# Patient Record
Sex: FEMALE | Race: WHITE
[De-identification: names, ages, dates, MRNs, and addresses within clinical notes are randomized per-mention and may not be internally consistent; named-entity substitution may affect disease eponyms.]

---

## 2018-11-26 ENCOUNTER — HOSPITAL ENCOUNTER (EMERGENCY)
Dept: HOSPITAL 61 - ER | Age: 23
Discharge: HOME | End: 2018-11-26
Payer: SELF-PAY

## 2018-11-26 VITALS — WEIGHT: 120 LBS | HEIGHT: 63 IN | BODY MASS INDEX: 21.26 KG/M2

## 2018-11-26 VITALS — SYSTOLIC BLOOD PRESSURE: 112 MMHG | DIASTOLIC BLOOD PRESSURE: 67 MMHG

## 2018-11-26 DIAGNOSIS — L03.012: Primary | ICD-10-CM

## 2018-11-26 PROCEDURE — 10060 I&D ABSCESS SIMPLE/SINGLE: CPT

## 2018-11-26 PROCEDURE — 99283 EMERGENCY DEPT VISIT LOW MDM: CPT

## 2018-11-26 NOTE — PHYS DOC
Adult General


Chief Complaint


Chief Complaint:  THUMB





HPI


HPI





Patient is a 22  year old female who presents with pain and swelling over the 

left thumb and around the thumbnail. The patient has had symptoms over the last 

3-4 days. Symptoms became more severe over the last 24 hours. No fever or 

chills. She denies similar symptoms in the past. The patient does state she 

chews on her fingernails. She is uncertain when her last tetanus shot was. She 

denies additional complaints today. No trauma to that digit





Review of Systems


Review of Systems





Constitutional: Denies fever 


HENT: Denies 


Respiratory: Denies 


Cardiovascular: No additional information 


: Denies


Musculoskeletal: Denies 


Integument: Denies rash or skin lesions 





All other systems were reviewed and found to be within normal limits, except as 

documented in this note.





Current Medications


Current Medications





Current Medications








 Medications


  (Trade)  Dose


 Ordered  Sig/Katlyn  Start Time


 Stop Time Status Last Admin


Dose Admin


 


 Acetaminophen/


 Hydrocodone Bitart


  (Lortab 5/325)  2 tab  1X  ONCE  11/26/18 05:30


 11/26/18 05:31 DC 11/26/18 05:13


2 TAB


 


 Diphtheria/


 Tetanus/Acell


 Pertussis


  (Boostrix)  0.5 ml  ONCE ONCE  11/26/18 05:30


 11/26/18 05:31 DC  





 


 Lidocaine HCl


  (Lidocaine 1%


 20ml Vial)  20 ml  1X  ONCE  11/26/18 05:00


 11/26/18 05:01 DC 11/26/18 04:35


20 ML











Allergies


Allergies





Allergies








Coded Allergies Type Severity Reaction Last Updated Verified


 


  No Known Drug Allergies    11/26/18 No











Physical Exam


Physical Exam





Constitutional: Well developed, well nourished, no acute distress, non-toxic 

appearance


HENT: Normocephalic, atraumatic, bilateral external ears normal, oropharynx 

moist


Eyes: PERRLA 


Neck: Normal range of motion  


Skin: Warm, dry, no erythema  


Extremities: erythema and swelling around the left thumbnail 


Neurologic: Alert and oriented X 3


Psychologic: Affect normal





Current Patient Data


Vital Signs





 Vital Signs








  Date Time  Temp Pulse Resp B/P (MAP) Pulse Ox O2 Delivery O2 Flow Rate FiO2


 


11/26/18 05:13   16  100 Room Air  


 


11/26/18 03:57  98  112/67 (82)    











EKG


EKG


[]





Radiology/Procedures


Radiology/Procedures


[]





Course & Med Decision Making


Course & Med Decision Making


Pertinent Labs and Imaging studies reviewed. (See chart for details)





Patient was evaluated in the emergency department for a paronychia. The fluid 

was drained. Referred to the procedure note below. In the ER, she was offered a 

tetanus booster but she refused this injection. She was given 2 Norco for pain. 

The patient was not driving herself home. She is also started on Keflex. At 

discharge, appropriate use of opiate medications was discussed. All of her 

questions were answered prior to discharge home.





Procedure note:  Paronychia left thumb nail. Local anesthesia was provided with 

5 total milliliters of 1% lidocaine. This was after the area was cleansed with 

Betadine. Once adequate anesthesia was achieved, curved scissors were used to 

release the eponychia from the nail. Local tissue was probed and there were 

several pockets of purulent fluid which were relieved. Patient tolerated the 

procedure very well.  Dressing and splint was placed for comfort.





Dragon Disclaimer


Dragon Disclaimer


This electronic medical record was generated, in whole or in part, using a 

voice recognition dictation system.





Departure


Departure


Disposition:  01 HOME, SELF-CARE


Condition:  GOOD


Referrals:  


NO PCP (PCP)


Scripts


Cephalexin (KEFLEX) 500 Mg Capsule


500 MG PO QID for 10 Days, #40 CAP


   Prov: ANNETTE SILVER DO         11/26/18 


Hydrocodone/Apap 5-325 (NORCO 5-325 TABLET) 1 Each Tablet


1-2 EACH PO PRN Q6HRS PRN for SEVERE PAIN, #15


   as needed for pain


   Prov: ANNETTE SILVER DO         11/26/18











ANNETTE SILVER DO Nov 26, 2018 04:04